# Patient Record
Sex: FEMALE | Race: WHITE | Employment: OTHER | URBAN - METROPOLITAN AREA
[De-identification: names, ages, dates, MRNs, and addresses within clinical notes are randomized per-mention and may not be internally consistent; named-entity substitution may affect disease eponyms.]

---

## 2017-01-05 ENCOUNTER — IMPORTED ENCOUNTER (OUTPATIENT)
Dept: URBAN - METROPOLITAN AREA CLINIC 50 | Facility: CLINIC | Age: 82
End: 2017-01-05

## 2017-01-10 ENCOUNTER — IMPORTED ENCOUNTER (OUTPATIENT)
Dept: URBAN - METROPOLITAN AREA CLINIC 50 | Facility: CLINIC | Age: 82
End: 2017-01-10

## 2017-02-02 ENCOUNTER — IMPORTED ENCOUNTER (OUTPATIENT)
Dept: URBAN - METROPOLITAN AREA CLINIC 50 | Facility: CLINIC | Age: 82
End: 2017-02-02

## 2017-02-07 ENCOUNTER — IMPORTED ENCOUNTER (OUTPATIENT)
Dept: URBAN - METROPOLITAN AREA CLINIC 50 | Facility: CLINIC | Age: 82
End: 2017-02-07

## 2017-02-28 ENCOUNTER — IMPORTED ENCOUNTER (OUTPATIENT)
Dept: URBAN - METROPOLITAN AREA CLINIC 50 | Facility: CLINIC | Age: 82
End: 2017-02-28

## 2017-03-07 ENCOUNTER — IMPORTED ENCOUNTER (OUTPATIENT)
Dept: URBAN - METROPOLITAN AREA CLINIC 50 | Facility: CLINIC | Age: 82
End: 2017-03-07

## 2017-03-08 ENCOUNTER — IMPORTED ENCOUNTER (OUTPATIENT)
Dept: URBAN - METROPOLITAN AREA CLINIC 50 | Facility: CLINIC | Age: 82
End: 2017-03-08

## 2017-04-04 ENCOUNTER — IMPORTED ENCOUNTER (OUTPATIENT)
Dept: URBAN - METROPOLITAN AREA CLINIC 50 | Facility: CLINIC | Age: 82
End: 2017-04-04

## 2017-11-14 ENCOUNTER — IMPORTED ENCOUNTER (OUTPATIENT)
Dept: URBAN - METROPOLITAN AREA CLINIC 50 | Facility: CLINIC | Age: 82
End: 2017-11-14

## 2017-12-04 ENCOUNTER — IMPORTED ENCOUNTER (OUTPATIENT)
Dept: URBAN - METROPOLITAN AREA CLINIC 50 | Facility: CLINIC | Age: 82
End: 2017-12-04

## 2017-12-05 ENCOUNTER — IMPORTED ENCOUNTER (OUTPATIENT)
Dept: URBAN - METROPOLITAN AREA CLINIC 50 | Facility: CLINIC | Age: 82
End: 2017-12-05

## 2017-12-06 ENCOUNTER — IMPORTED ENCOUNTER (OUTPATIENT)
Dept: URBAN - METROPOLITAN AREA CLINIC 50 | Facility: CLINIC | Age: 82
End: 2017-12-06

## 2017-12-08 ENCOUNTER — IMPORTED ENCOUNTER (OUTPATIENT)
Dept: URBAN - METROPOLITAN AREA CLINIC 50 | Facility: CLINIC | Age: 82
End: 2017-12-08

## 2018-02-13 ENCOUNTER — IMPORTED ENCOUNTER (OUTPATIENT)
Dept: URBAN - METROPOLITAN AREA CLINIC 50 | Facility: CLINIC | Age: 83
End: 2018-02-13

## 2018-02-14 ENCOUNTER — IMPORTED ENCOUNTER (OUTPATIENT)
Dept: URBAN - METROPOLITAN AREA CLINIC 50 | Facility: CLINIC | Age: 83
End: 2018-02-14

## 2018-03-19 ENCOUNTER — IMPORTED ENCOUNTER (OUTPATIENT)
Dept: URBAN - METROPOLITAN AREA CLINIC 50 | Facility: CLINIC | Age: 83
End: 2018-03-19

## 2018-04-18 ENCOUNTER — IMPORTED ENCOUNTER (OUTPATIENT)
Dept: URBAN - METROPOLITAN AREA CLINIC 50 | Facility: CLINIC | Age: 83
End: 2018-04-18

## 2018-07-11 ENCOUNTER — IMPORTED ENCOUNTER (OUTPATIENT)
Dept: URBAN - METROPOLITAN AREA CLINIC 50 | Facility: CLINIC | Age: 83
End: 2018-07-11

## 2018-10-23 ENCOUNTER — IMPORTED ENCOUNTER (OUTPATIENT)
Dept: URBAN - METROPOLITAN AREA CLINIC 50 | Facility: CLINIC | Age: 83
End: 2018-10-23

## 2018-10-23 NOTE — PATIENT DISCUSSION
"""Reassured patient that intraocular pressures (IOPs) are at target levels and other ocular findings ""

## 2018-10-26 ENCOUNTER — IMPORTED ENCOUNTER (OUTPATIENT)
Dept: URBAN - METROPOLITAN AREA CLINIC 50 | Facility: CLINIC | Age: 83
End: 2018-10-26

## 2019-04-16 ENCOUNTER — IMPORTED ENCOUNTER (OUTPATIENT)
Dept: URBAN - METROPOLITAN AREA CLINIC 50 | Facility: CLINIC | Age: 84
End: 2019-04-16

## 2019-04-22 NOTE — PATIENT DISCUSSION
"""S/P [facility] OS --clear IOL """ Call made to pharmacy to discuss. Marcus states that patient picked up 3 brand tablets and paid out of pocket $56. He is unable to see alternative and will resend this information again.   Per review of [facility], patient was previously on brand Lexapro. Patient cannot tolerate generic.      LEXAPRO 20 MG tablet 90 Tab 1 4/16/2018  Yes   Sig: TAKE 1 TABLET BY MOUTH DAILY AT NIGHT     In review of chart,   \"RYAN GUZMÁN   Mon Jan 13, 2014  1:02 PM  Insurance contacted us regarding having the patient use the generic form. Patient called and stated she gets palpitations when she uses the generic, so pays more for the brand.\"    A PA will need to be completed for patient to receive brand.   Waiting for fax.

## 2019-04-29 ENCOUNTER — IMPORTED ENCOUNTER (OUTPATIENT)
Dept: URBAN - METROPOLITAN AREA CLINIC 50 | Facility: CLINIC | Age: 84
End: 2019-04-29

## 2019-05-08 ENCOUNTER — IMPORTED ENCOUNTER (OUTPATIENT)
Dept: URBAN - METROPOLITAN AREA CLINIC 50 | Facility: CLINIC | Age: 84
End: 2019-05-08

## 2019-05-10 ENCOUNTER — IMPORTED ENCOUNTER (OUTPATIENT)
Dept: URBAN - METROPOLITAN AREA CLINIC 50 | Facility: CLINIC | Age: 84
End: 2019-05-10

## 2019-08-27 ENCOUNTER — IMPORTED ENCOUNTER (OUTPATIENT)
Dept: URBAN - METROPOLITAN AREA CLINIC 50 | Facility: CLINIC | Age: 84
End: 2019-08-27

## 2019-10-21 ENCOUNTER — IMPORTED ENCOUNTER (OUTPATIENT)
Dept: URBAN - METROPOLITAN AREA CLINIC 50 | Facility: CLINIC | Age: 84
End: 2019-10-21

## 2019-10-21 NOTE — PATIENT DISCUSSION
"""s/p bilateral ECP and iStent.  IOP stable on present drop therapy."" ""Continue Timolol GFS left eye in the morning ."" ""Continue Brimonidine 0.1% left eye twice a day ."" ""Continue Latanoprost both eyes at bedtime ."""

## 2020-04-21 ENCOUNTER — IMPORTED ENCOUNTER (OUTPATIENT)
Dept: URBAN - METROPOLITAN AREA CLINIC 50 | Facility: CLINIC | Age: 85
End: 2020-04-21

## 2020-05-12 ENCOUNTER — IMPORTED ENCOUNTER (OUTPATIENT)
Dept: URBAN - METROPOLITAN AREA CLINIC 50 | Facility: CLINIC | Age: 85
End: 2020-05-12

## 2020-05-29 ENCOUNTER — IMPORTED ENCOUNTER (OUTPATIENT)
Dept: URBAN - METROPOLITAN AREA CLINIC 50 | Facility: CLINIC | Age: 85
End: 2020-05-29

## 2020-06-17 ENCOUNTER — IMPORTED ENCOUNTER (OUTPATIENT)
Dept: URBAN - METROPOLITAN AREA CLINIC 50 | Facility: CLINIC | Age: 85
End: 2020-06-17

## 2020-06-26 ENCOUNTER — IMPORTED ENCOUNTER (OUTPATIENT)
Dept: URBAN - METROPOLITAN AREA CLINIC 50 | Facility: CLINIC | Age: 85
End: 2020-06-26

## 2020-09-21 ENCOUNTER — IMPORTED ENCOUNTER (OUTPATIENT)
Dept: URBAN - METROPOLITAN AREA CLINIC 50 | Facility: CLINIC | Age: 85
End: 2020-09-21

## 2020-10-21 ENCOUNTER — IMPORTED ENCOUNTER (OUTPATIENT)
Dept: URBAN - METROPOLITAN AREA CLINIC 50 | Facility: CLINIC | Age: 85
End: 2020-10-21

## 2020-11-09 ENCOUNTER — IMPORTED ENCOUNTER (OUTPATIENT)
Dept: URBAN - METROPOLITAN AREA CLINIC 50 | Facility: CLINIC | Age: 85
End: 2020-11-09

## 2020-11-16 ENCOUNTER — IMPORTED ENCOUNTER (OUTPATIENT)
Dept: URBAN - METROPOLITAN AREA CLINIC 50 | Facility: CLINIC | Age: 85
End: 2020-11-16

## 2020-11-23 ENCOUNTER — IMPORTED ENCOUNTER (OUTPATIENT)
Dept: URBAN - METROPOLITAN AREA CLINIC 50 | Facility: CLINIC | Age: 85
End: 2020-11-23

## 2020-12-22 ENCOUNTER — IMPORTED ENCOUNTER (OUTPATIENT)
Dept: URBAN - METROPOLITAN AREA CLINIC 50 | Facility: CLINIC | Age: 85
End: 2020-12-22

## 2021-02-15 ENCOUNTER — IMPORTED ENCOUNTER (OUTPATIENT)
Dept: URBAN - METROPOLITAN AREA CLINIC 50 | Facility: CLINIC | Age: 86
End: 2021-02-15

## 2021-02-15 NOTE — PATIENT DISCUSSION
"""s/p SLT OD 11/23/2020; OS 11/16/2020- IOP improved s/p SLT. "" ""Continue CARTEOLOL 1 % both eyes twice a day ."" ""Continue Brimonidine 0.1% left eye twice a day ."" ""Continue Latanoprost both eyes at bedtime ."""

## 2021-02-22 ENCOUNTER — IMPORTED ENCOUNTER (OUTPATIENT)
Dept: URBAN - METROPOLITAN AREA CLINIC 50 | Facility: CLINIC | Age: 86
End: 2021-02-22

## 2021-04-12 ENCOUNTER — IMPORTED ENCOUNTER (OUTPATIENT)
Dept: URBAN - METROPOLITAN AREA CLINIC 50 | Facility: CLINIC | Age: 86
End: 2021-04-12

## 2021-04-17 ASSESSMENT — TONOMETRY
OS_IOP_MMHG: 20
OS_IOP_MMHG: 19
OD_IOP_MMHG: 18
OS_IOP_MMHG: 17
OS_IOP_MMHG: 21
OD_IOP_MMHG: 19
OD_IOP_MMHG: 12
OD_IOP_MMHG: 15
OD_IOP_MMHG: 14
OS_IOP_MMHG: 17
OS_IOP_MMHG: 22
OD_IOP_MMHG: 21
OS_IOP_MMHG: 14
OD_IOP_MMHG: 15
OD_IOP_MMHG: 21
OD_IOP_MMHG: 16
OD_IOP_MMHG: 22
OD_IOP_MMHG: 18
OD_IOP_MMHG: 14
OS_IOP_MMHG: 19
OS_IOP_MMHG: 18
OS_IOP_MMHG: 18
OD_IOP_MMHG: 17
OD_IOP_MMHG: 14
OS_IOP_MMHG: 14
OD_IOP_MMHG: 20
OS_IOP_MMHG: 12
OS_IOP_MMHG: 16
OS_IOP_MMHG: 18
OD_IOP_MMHG: 22
OS_IOP_MMHG: 19
OD_IOP_MMHG: 16
OS_IOP_MMHG: 17
OD_IOP_MMHG: 10
OS_IOP_MMHG: 15

## 2021-04-17 ASSESSMENT — PACHYMETRY
OS_CT_UM: 536
OD_CT_UM: 537
OS_CT_UM: 536
OS_CT_UM: 536
OD_CT_UM: 537
OS_CT_UM: 536
OD_CT_UM: 537
OS_CT_UM: 536
OD_CT_UM: 537
OS_CT_UM: 536
OD_CT_UM: 537
OS_CT_UM: 536
OD_CT_UM: 537
OS_CT_UM: 536
OD_CT_UM: 537
OS_CT_UM: 536
OD_CT_UM: 537
OS_CT_UM: 536
OD_CT_UM: 537
OS_CT_UM: 536
OD_CT_UM: 537
OS_CT_UM: 536
OS_CT_UM: 536
OD_CT_UM: 537
OD_CT_UM: 537
OS_CT_UM: 536
OS_CT_UM: 536
OD_CT_UM: 537
OS_CT_UM: 536
OS_CT_UM: 536
OD_CT_UM: 537
OS_CT_UM: 536
OS_CT_UM: 536
OD_CT_UM: 537
OD_CT_UM: 537
OS_CT_UM: 536
OD_CT_UM: 537
OS_CT_UM: 536
OD_CT_UM: 537
OS_CT_UM: 536
OS_CT_UM: 536
OD_CT_UM: 537
OS_CT_UM: 536
OD_CT_UM: 537
OS_CT_UM: 536

## 2021-04-17 ASSESSMENT — VISUAL ACUITY
OD_SC: 20/30
OD_PH: 20/30
OS_BAT: 20/400
OS_SC: 20/40
OS_SC: 20/30+
OD_OTHER: 20/25. 20/25.
OD_SC: 20/50
OD_SC: 20/40
OS_SC: 20/40+2
OD_BAT: 20/25
OS_PH: 20/30-
OS_OTHER: 20/50. 20/60.
OS_PH: 20/30
OD_CC: J3@ 14 IN
OS_PH: 20/40
OD_SC: 20/30
OS_CC: J5@ 17 IN
OS_CC: 20/20
OD_CC: 20/20
OS_BAT: 20/50
OD_OTHER: 20/>400. 20/>400.
OD_SC: 20/20
OS_SC: 20/25-2
OD_SC: 20/30+2
OD_BAT: 20/>400
OD_SC: 20/25+1
OD_CC: J1+
OS_SC: 20/25-2
OS_SC: 20/40
OS_PH: 20/30
OS_SC: 20/30
OS_PH: @ 22 IN
OD_PH: 20/40
OD_PH: 20/40
OD_SC: 20/60+
OD_OTHER: 20/40. 20/60.
OD_SC: 20/40
OS_SC: 20/30
OD_BAT: 20/40
OS_CC: 20/40
OS_SC: 20/30-2
OS_CC: J1+
OS_OTHER: 20/400. 20/>400.
OD_SC: 20/25-2
OD_SC: 20/40
OS_SC: 20/20
OD_SC: 20/20
OS_SC: 20/400
OS_SC: 20/25-2
OD_SC: 20/25+
OD_SC: 20/40
OD_CC: 20/20
OD_PH: 20/25-
OD_SC: 20/200
OD_CC: J5@ 17 IN
OS_SC: 20/30
OS_SC: 20/30
OD_PH: @ 22 IN
OS_SC: 20/40
OS_CC: 20/20
OD_SC: 20/25-2
OS_CC: J3@ 14 IN

## 2021-05-22 ENCOUNTER — PREPPED CHART (OUTPATIENT)
Dept: URBAN - METROPOLITAN AREA CLINIC 50 | Facility: CLINIC | Age: 86
End: 2021-05-22

## 2021-05-22 ASSESSMENT — VISUAL ACUITY
OU_SC: J5
OS_SC: 20/25
OD_SC: 20/25

## 2021-05-22 ASSESSMENT — TONOMETRY
OS_IOP_MMHG: 16
OD_IOP_MMHG: 14
OS_IOP_MMHG: 16
OD_IOP_MMHG: 14

## 2021-05-24 ENCOUNTER — IOP CHECK (OUTPATIENT)
Dept: URBAN - METROPOLITAN AREA CLINIC 50 | Facility: CLINIC | Age: 86
End: 2021-05-24

## 2021-05-24 DIAGNOSIS — H47.233: ICD-10-CM

## 2021-05-24 DIAGNOSIS — H16.223: ICD-10-CM

## 2021-05-24 DIAGNOSIS — H40.1132: ICD-10-CM

## 2021-05-24 DIAGNOSIS — H43.813: ICD-10-CM

## 2021-05-24 PROCEDURE — 92012 INTRM OPH EXAM EST PATIENT: CPT

## 2021-05-24 ASSESSMENT — VISUAL ACUITY
OS_SC: 20/25
OU_SC: 20/25
OD_SC: 20/25

## 2021-05-24 ASSESSMENT — TONOMETRY
OS_IOP_MMHG: 14
OD_IOP_MMHG: 13
OS_IOP_MMHG: 14
OD_IOP_MMHG: 13

## 2021-05-24 NOTE — PATIENT DISCUSSION
s/p SLT OD 11/23/2020; OS 11/16/2020- IOP improved s/p SLT. Continue CARTEOLOL 1 % both eyes twice a day . Continue Brimonidine 0.1% left eye twice a day . Continue Latanoprost both eyes at bedtime. HVF/OCT on RTC.

## 2021-10-20 ENCOUNTER — DIAGNOSTIC TESTING ONLY (OUTPATIENT)
Dept: URBAN - METROPOLITAN AREA CLINIC 52 | Facility: CLINIC | Age: 86
End: 2021-10-20

## 2021-10-20 DIAGNOSIS — H47.233: ICD-10-CM

## 2021-10-20 PROCEDURE — 92273 FULL FIELD ERG W/I&R: CPT

## 2021-10-25 ENCOUNTER — COMPREHENSIVE EXAM (OUTPATIENT)
Dept: URBAN - METROPOLITAN AREA CLINIC 50 | Facility: CLINIC | Age: 86
End: 2021-10-25

## 2021-10-25 DIAGNOSIS — H35.3130: ICD-10-CM

## 2021-10-25 DIAGNOSIS — H40.1132: ICD-10-CM

## 2021-10-25 DIAGNOSIS — H47.233: ICD-10-CM

## 2021-10-25 DIAGNOSIS — H35.373: ICD-10-CM

## 2021-10-25 DIAGNOSIS — H16.223: ICD-10-CM

## 2021-10-25 DIAGNOSIS — E11.9: ICD-10-CM

## 2021-10-25 PROCEDURE — 92133 CPTRZD OPH DX IMG PST SGM ON: CPT

## 2021-10-25 PROCEDURE — 92014 COMPRE OPH EXAM EST PT 1/>: CPT

## 2021-10-25 PROCEDURE — 92083 EXTENDED VISUAL FIELD XM: CPT

## 2021-10-25 ASSESSMENT — TONOMETRY
OD_IOP_MMHG: 13
OS_IOP_MMHG: 14
OD_IOP_MMHG: 13
OS_IOP_MMHG: 14

## 2021-10-25 ASSESSMENT — VISUAL ACUITY
OS_SC: 20/25
OU_SC: 20/20
OD_SC: 20/25

## 2021-10-25 NOTE — PATIENT DISCUSSION
IOP stable on current drop therapy.  Continue CARTEOLOL 1 % both eyes twice a day . Continue Brimonidine 0.1% left eye twice a day . Continue Latanoprost both eyes at bedtime.

## 2022-04-04 ENCOUNTER — FOLLOW UP (OUTPATIENT)
Dept: URBAN - METROPOLITAN AREA CLINIC 50 | Facility: CLINIC | Age: 87
End: 2022-04-04

## 2022-04-04 DIAGNOSIS — H40.1132: ICD-10-CM

## 2022-04-04 PROCEDURE — 92012 INTRM OPH EXAM EST PATIENT: CPT

## 2022-04-04 ASSESSMENT — TONOMETRY
OS_IOP_MMHG: 13
OD_IOP_MMHG: 12
OS_IOP_MMHG: 13
OD_IOP_MMHG: 12

## 2022-04-04 ASSESSMENT — VISUAL ACUITY
OS_SC: 20/25
OD_SC: 20/25
OU_SC: 20/25

## 2025-02-16 NOTE — PATIENT DISCUSSION
"""IOP Stable Anemia due to blood loss secondary to GI bleed.     Patient's hemorrhage is due to gastrointestinal bleed, this bleeding is associated with an anticoagulant, the anticoagulant is Select Anticoagulant(s): Direct oral anticoagulant: Rivaroxaban (Xarelto). Patients most recent Hgb, Hct, platelets, and INR are listed below.  Recent Labs     02/15/25  1353 02/16/25  0916   HGB 9.9*  9.9* 8.9*   HCT 33.4*  33.4* 29.1*     345 256   INR 1.0  --        Plan  - Will trend hemoglobin/hematocrit Daily  - Will monitor and correct any coagulation defects  - Will transfuse if Hgb is <7g/dl (<8g/dl in cases of active ACS) or if patient has rapid bleeding leading to hemodynamic instability  - hold xarelto  - Check iron studies

## 2025-03-27 NOTE — PATIENT DISCUSSION
"""S/P IOL OD--Patient to follow post operative drops and schedule as directed.  "" At risk for bleeding

## 2025-07-02 NOTE — PATIENT DISCUSSION
"""Call if vision decreases or RD warning signs increases/RD warnings given.  No signs of retinal ""
"""Continue Preservative free tears both eyes two - three times a day
"""Follow drusen without treatment. Call if vision or distortion increases. Recommend regular amsler checks. """
"""Monitor ERM for changes. Informed patient of potential for worsening.  Instructed patient to call ""
"""s/p SLT OD 11/23/2020; OS 11/16/2020- IOP improved s/p SLT. ""
Detail Level: Detailed
Detail Level: Generalized
Detail Level: Simple